# Patient Record
Sex: MALE | Race: WHITE | NOT HISPANIC OR LATINO | URBAN - METROPOLITAN AREA
[De-identification: names, ages, dates, MRNs, and addresses within clinical notes are randomized per-mention and may not be internally consistent; named-entity substitution may affect disease eponyms.]

---

## 2020-08-11 ENCOUNTER — NURSE TRIAGE (OUTPATIENT)
Dept: OTHER | Facility: OTHER | Age: 25
End: 2020-08-11

## 2020-08-11 DIAGNOSIS — Z11.59 SPECIAL SCREENING EXAMINATION FOR VIRAL DISEASE: Primary | ICD-10-CM

## 2020-08-11 NOTE — TELEPHONE ENCOUNTER
Reason for Disposition   COVID-19 Testing, questions about    Answer Assessment - Initial Assessment Questions  1  CLOSE CONTACT: "Who is the person with the confirmed or suspected COVID-19 infection that you were exposed to?"      unknown  2  PLACE of CONTACT: "Where were you when you were exposed to COVID-19?" (e g , home, school, medical waiting room; which city?)     Was to [facility]  3  TYPE of CONTACT: "How much contact was there?" (e g , sitting next to, live in same house, work in same office, same building)     none  4  DURATION of CONTACT: "How long were you in contact with the COVID-19 patient?" (e g , a few seconds, passed by person, a few minutes, live with the patient)     unknown  5  DATE of CONTACT: "When did you have contact with a COVID-19 patient?" (e g , how many days ago)     unknown  6  TRAVEL: "Have you traveled out of the country recently?" If so, "When and where?"      * Also ask about out-of-state travel, since the CDC has identified some high-risk cities for community spread in the [address]  * Note: Travel becomes less relevant if there is widespread community transmission where the patient lives  [facility]  7  COMMUNITY SPREAD: "Are there lots of cases of COVID-19 (community spread) where you live?" (See public health department website, if unsure)      Lives in [facility]  8   SYMPTOMS: "Do you have any symptoms?" (e g , fever, cough, breathing difficulty)    none    Desires testing prior to visiting family    Protocols used: CORONAVIRUS (COVID-19) EXPOSURE-ADULT-[place]

## 2020-08-11 NOTE — TELEPHONE ENCOUNTER
Regarding: [id number]  ----- Message from Shamika Miles sent at 8/11/2020  9:01 AM EDT -----  "I would like a COVID-19 test "

## 2020-08-12 DIAGNOSIS — Z11.59 SPECIAL SCREENING EXAMINATION FOR VIRAL DISEASE: ICD-10-CM

## 2020-08-12 PROCEDURE — U0003 INFECTIOUS AGENT DETECTION BY NUCLEIC ACID (DNA OR RNA); SEVERE ACUTE RESPIRATORY SYNDROME CORONAVIRUS 2 (SARS-COV-2) (CORONAVIRUS DISEASE [COVID-19]), AMPLIFIED PROBE TECHNIQUE, MAKING USE OF HIGH THROUGHPUT TECHNOLOGIES AS DESCRIBED BY CMS-2020-01-R: HCPCS | Performed by: FAMILY MEDICINE

## 2020-08-13 LAB — SARS-COV-2 RNA SPEC QL NAA+PROBE: NOT DETECTED

## 2020-08-14 ENCOUNTER — TELEPHONE (OUTPATIENT)
Dept: OTHER | Facility: OTHER | Age: 25
End: 2020-08-14

## 2020-08-14 NOTE — TELEPHONE ENCOUNTER
Your test for COVID-19, also known as novel coronavirus, came back negative  You do not have COVID-19  If you have any additional questions, we can schedule a virtual visit for you with a provider or call the [facility] hotline [phone] Option 7 for care advice  For additional information , please visit the Coronavirus FAQ on the [id number] [facility] ([facility])

## 2021-06-23 ENCOUNTER — APPOINTMENT (OUTPATIENT)
Dept: LAB | Facility: CLINIC | Age: 26
End: 2021-06-23
Payer: COMMERCIAL

## 2024-01-18 ENCOUNTER — APPOINTMENT (OUTPATIENT)
Dept: RADIOLOGY | Facility: MEDICAL CENTER | Age: 29
End: 2024-01-18
Payer: COMMERCIAL

## 2024-01-18 ENCOUNTER — OFFICE VISIT (OUTPATIENT)
Dept: URGENT CARE | Facility: MEDICAL CENTER | Age: 29
End: 2024-01-18
Payer: COMMERCIAL

## 2024-01-18 VITALS
OXYGEN SATURATION: 98 % | RESPIRATION RATE: 20 BRPM | WEIGHT: 244 LBS | SYSTOLIC BLOOD PRESSURE: 122 MMHG | TEMPERATURE: 97.7 F | HEART RATE: 87 BPM | DIASTOLIC BLOOD PRESSURE: 72 MMHG

## 2024-01-18 DIAGNOSIS — S16.1XXA ACUTE STRAIN OF NECK MUSCLE, INITIAL ENCOUNTER: ICD-10-CM

## 2024-01-18 DIAGNOSIS — S39.92XA BACK INJURY, INITIAL ENCOUNTER: ICD-10-CM

## 2024-01-18 DIAGNOSIS — S29.019A THORACIC MYOFASCIAL STRAIN, INITIAL ENCOUNTER: Primary | ICD-10-CM

## 2024-01-18 PROCEDURE — 99212 OFFICE O/P EST SF 10 MIN: CPT | Performed by: PHYSICIAN ASSISTANT

## 2024-01-18 PROCEDURE — 72040 X-RAY EXAM NECK SPINE 2-3 VW: CPT

## 2024-01-18 PROCEDURE — 72070 X-RAY EXAM THORAC SPINE 2VWS: CPT

## 2024-01-18 RX ORDER — PREDNISONE 20 MG/1
TABLET ORAL
Qty: 12 TABLET | Refills: 0 | Status: SHIPPED | OUTPATIENT
Start: 2024-01-18

## 2024-01-18 RX ORDER — BACLOFEN 20 MG/1
20 TABLET ORAL 3 TIMES DAILY PRN
Qty: 20 TABLET | Refills: 0 | Status: SHIPPED | OUTPATIENT
Start: 2024-01-18

## 2024-01-18 NOTE — PROGRESS NOTES
[facility]        NAME: Woody Jones is a 28 y.o. male  : 1995    MRN: [id number]  DATE: 2024  TIME: 10:16 AM    Assessment and Plan   Thoracic myofascial strain, initial encounter [S29.019A]  1. Thoracic myofascial strain, initial encounter  baclofen 20 mg tablet    predniSONE 20 mg tablet      2. Acute strain of neck muscle, initial encounter  baclofen 20 mg tablet    predniSONE 20 mg tablet      3. Back injury, initial encounter  XR spine cervical 2 or 3 vw injury    XR spine thoracic 2 vw    XR spine thoracic 2 vw    XR spine cervical 2 or 3 vw injury            Patient Instructions     Start Prednisone  Take Baclofen as needed for muscle spasm / pain. Do not take the other muscle relaxer you were taking  Take Tylenol as needed for pain  If symptoms fail to improve follow up with PCP as you may need physical therapy to help work out the spasms  Follow up with PCP in 3-5 days.  Proceed to  ER if symptoms worsen.    Chief Complaint     Chief Complaint   Patient presents with   • Back Pain   • Neck Pain     Fell on 2023  Slipped on ice going down steps. Was a little pain to upper left side back and neck and Saturday is subsided but  got really worse. Has been taking motrin, tramadol, and muscle relaxer. Has numbness to left arm         History of Present Illness       Patient fell down some icy steps 2024.  He had some pain to his neck and upper back which dissipated in a few days.  5 days ago the pain reoccurred is primarily isolated in the left upper back into the neck.  Is associated slight numbness and tingling of his left arm.  Denies muscle weakness.  He has been taking muscle relaxer, ibuprofen and tramadol with minimal improvement.      Review of Systems   Review of Systems   Constitutional:  Negative for fever.   Musculoskeletal:  Positive for back pain and neck pain.   Neurological:  Positive for numbness (left arm). Negative for weakness.          Current Medications       Current Outpatient Medications:   •  baclofen 20 mg tablet, Take 1 tablet (20 mg total) by mouth 3 (three) times a day as needed for muscle spasms, Disp: 20 tablet, Rfl: 0  •  predniSONE 20 mg tablet, 2 tabs daily x 5 days, 1 tab daily x 2 days, Disp: 12 tablet, Rfl: 0    Current Allergies     Allergies as of 01/18/2024 - Reviewed 01/18/2024   Allergen Reaction Noted   • Tetracycline Rash 01/18/2024            The following portions of the patient's history were reviewed and updated as appropriate: allergies, current medications, past family history, past medical history, past social history, past surgical history and problem list.     History reviewed. No pertinent past medical history.    History reviewed. No pertinent surgical history.    History reviewed. No pertinent family history.      Medications have been verified.        Objective   /72   Pulse 87   Temp 97.7 °F (36.5 °C)   Resp 20   Wt 111 kg (244 lb)   SpO2 98%   No LMP for male patient.       Physical Exam     Physical Exam  Vitals and nursing note reviewed.   Constitutional:       Appearance: Normal appearance.   HENT:      Head: Normocephalic and atraumatic.   Cardiovascular:      Rate and Rhythm: Normal rate.   Pulmonary:      Effort: Pulmonary effort is normal.   Musculoskeletal:      Comments: No cervical vertebral TTP, mild tightness of cervical paraspinal muscle  Thoracic spine without TTP, Left intrascapular muscle tightness and TTP, no rashes.   Skin:     General: Skin is warm.   Neurological:      Mental Status: He is alert.     Cervical spine xray no acute osseous abnormality, straightening of cervical lordosis    Thoracic xray no acute osseous abnormality

## 2024-01-18 NOTE — LETTER
January 18, 2024     Patient: Woody Jones   YOB: 1995   Date of Visit: 1/18/2024       To Whom It May Concern:    It is my medical opinion that Woody Jones may return to work on 01/.    If you have any questions or concerns, please don't hesitate to call.         Sincerely,        Bel Bird PA-C    CC:   No Recipients

## 2024-01-18 NOTE — PATIENT INSTRUCTIONS
Start Prednisone  Take Baclofen as needed for muscle spasm / pain. Do not take the other muscle relaxer you were taking  Take Tylenol as needed for pain  If symptoms fail to improve follow up with PCP as you may need physical therapy to help work out the spasms

## 2024-12-18 ENCOUNTER — OFFICE VISIT (OUTPATIENT)
Dept: URGENT CARE | Facility: CLINIC | Age: 29
End: 2024-12-18
Payer: COMMERCIAL

## 2024-12-18 VITALS
RESPIRATION RATE: 18 BRPM | DIASTOLIC BLOOD PRESSURE: 87 MMHG | TEMPERATURE: 99.4 F | SYSTOLIC BLOOD PRESSURE: 133 MMHG | HEART RATE: 75 BPM | OXYGEN SATURATION: 98 %

## 2024-12-18 DIAGNOSIS — J02.0 STREP THROAT: Primary | ICD-10-CM

## 2024-12-18 LAB — S PYO AG THROAT QL: POSITIVE

## 2024-12-18 PROCEDURE — 99213 OFFICE O/P EST LOW 20 MIN: CPT | Performed by: PHYSICIAN ASSISTANT

## 2024-12-18 PROCEDURE — 87880 STREP A ASSAY W/OPTIC: CPT | Performed by: PHYSICIAN ASSISTANT

## 2024-12-18 RX ORDER — AMOXICILLIN 500 MG/1
500 TABLET, FILM COATED ORAL 2 TIMES DAILY
Qty: 20 TABLET | Refills: 0 | Status: SHIPPED | OUTPATIENT
Start: 2024-12-18 | End: 2024-12-28

## 2024-12-18 NOTE — PATIENT INSTRUCTIONS
Rapid strep positive.  Will treat with 10-day course of amoxicillin.  Continue supportive care.  All patient's questions answered.

## 2024-12-18 NOTE — PROGRESS NOTES
[facility]        NAME: Woody Jones is a 29 y.o. male  : 1995    MRN: [id number]  DATE: 2024  TIME: 10:21 AM    Assessment and Plan   Strep throat [J02.0]  1. Strep throat  POCT rapid strepA    amoxicillin (AMOXIL) 500 MG tablet            Patient Instructions     Patient Instructions   Rapid strep positive.  Will treat with 10-day course of amoxicillin.  Continue supportive care.  All patient's questions answered.      Follow up with PCP in 3-5 days.  Proceed to  ER if symptoms worsen.    Chief Complaint     Chief Complaint   Patient presents with    Cough    Sore Throat    Fever     Started over week         History of Present Illness       Patient is a 29-year-old male presenting today with sore throat and headache x 5 days.  Patient notes of last few days he has had a persistent sore throat, headache and chills, has been taking over-the-counter Advil which has helped slightly.  Denies any known sick contacts.  Has felt febrile but no temperature taken.  Also reports some loose stools over the last few days.  Denies abdominal pain, lightheadedness, dizziness, voice change, trouble swallowing.        Review of Systems   Review of Systems   Constitutional:  Positive for fatigue and fever. Negative for chills.   HENT:  Positive for sore throat. Negative for congestion, ear pain, sinus pressure and trouble swallowing.    Eyes:  Negative for pain.   Respiratory:  Negative for cough, chest tightness and shortness of breath.    Cardiovascular:  Negative for chest pain.   Gastrointestinal:  Negative for abdominal pain.   Musculoskeletal:  Negative for myalgias.   Skin:  Negative for rash.   Neurological:  Positive for headaches.         Current Medications       Current Outpatient Medications:     amoxicillin (AMOXIL) 500 MG tablet, Take 1 tablet (500 mg total) by mouth 2 (two) times a day for 10 days, Disp: 20 tablet, Rfl: 0    baclofen 20 mg tablet, Take 1 tablet (20 mg total) by  mouth 3 (three) times a day as needed for muscle spasms (Patient not taking: Reported on 12/18/2024), Disp: 20 tablet, Rfl: 0    predniSONE 20 mg tablet, 2 tabs daily x 5 days, 1 tab daily x 2 days (Patient not taking: Reported on 12/18/2024), Disp: 12 tablet, Rfl: 0    Current Allergies     Allergies as of 12/18/2024 - Reviewed 12/18/2024   Allergen Reaction Noted    Tetracycline Rash 01/18/2024            The following portions of the patient's history were reviewed and updated as appropriate: allergies, current medications, past family history, past medical history, past social history, past surgical history and problem list.     No past medical history on file.    No past surgical history on file.    No family history on file.      Medications have been verified.        Objective   /87   Pulse 75   Temp 99.4 °F (37.4 °C)   Resp 18   SpO2 98%        Physical Exam     Physical Exam  Vitals reviewed.   Constitutional:       General: He is not in acute distress.  HENT:      Head: Normocephalic.      Right Ear: Tympanic membrane, ear canal and external ear normal.      Left Ear: Tympanic membrane, ear canal and external ear normal.      Nose: Nose normal.      Mouth/Throat:      Mouth: Mucous membranes are moist.      Pharynx: Posterior oropharyngeal erythema present. No oropharyngeal exudate.   Cardiovascular:      Rate and Rhythm: Normal rate and regular rhythm.      Pulses: Normal pulses.      Heart sounds: Normal heart sounds.   Pulmonary:      Effort: Pulmonary effort is normal.      Breath sounds: Normal breath sounds.   Abdominal:      General: Abdomen is flat. Bowel sounds are normal.      Palpations: Abdomen is soft.      Tenderness: There is no abdominal tenderness.   Lymphadenopathy:      Cervical: No cervical adenopathy.   Skin:     General: Skin is warm.      Capillary Refill: Capillary refill takes less than 2 seconds.   Neurological:      General: No focal deficit present.      Mental Status:  He is alert.

## 2025-04-24 ENCOUNTER — APPOINTMENT (EMERGENCY)
Dept: RADIOLOGY | Facility: HOSPITAL | Age: 30
End: 2025-04-24
Payer: COMMERCIAL

## 2025-04-24 ENCOUNTER — HOSPITAL ENCOUNTER (EMERGENCY)
Facility: HOSPITAL | Age: 30
Discharge: HOME | End: 2025-04-25
Attending: EMERGENCY MEDICINE
Payer: COMMERCIAL

## 2025-04-24 VITALS
OXYGEN SATURATION: 99 % | HEIGHT: 69 IN | HEART RATE: 86 BPM | TEMPERATURE: 98 F | RESPIRATION RATE: 19 BRPM | DIASTOLIC BLOOD PRESSURE: 78 MMHG | BODY MASS INDEX: 35.55 KG/M2 | SYSTOLIC BLOOD PRESSURE: 125 MMHG | WEIGHT: 240 LBS

## 2025-04-24 DIAGNOSIS — S23.9XXA THORACIC BACK SPRAIN, INITIAL ENCOUNTER: Primary | ICD-10-CM

## 2025-04-24 PROCEDURE — 99283 EMERGENCY DEPT VISIT LOW MDM: CPT

## 2025-04-24 PROCEDURE — 72070 X-RAY EXAM THORAC SPINE 2VWS: CPT

## 2025-04-24 RX ORDER — KETOROLAC TROMETHAMINE 30 MG/ML
30 INJECTION, SOLUTION INTRAMUSCULAR; INTRAVENOUS ONCE
Status: COMPLETED | OUTPATIENT
Start: 2025-04-24 | End: 2025-04-25

## 2025-04-24 RX ORDER — METHOCARBAMOL 500 MG/1
750 TABLET, FILM COATED ORAL ONCE
Status: COMPLETED | OUTPATIENT
Start: 2025-04-24 | End: 2025-04-25

## 2025-04-25 PROCEDURE — 99284 EMERGENCY DEPT VISIT MOD MDM: CPT | Performed by: EMERGENCY MEDICINE

## 2025-04-25 PROCEDURE — 96372 THER/PROPH/DIAG INJ SC/IM: CPT

## 2025-04-25 RX ORDER — KETOROLAC TROMETHAMINE 10 MG/1
10 TABLET, FILM COATED ORAL EVERY 6 HOURS PRN
Qty: 15 TABLET | Refills: 0 | Status: SHIPPED | OUTPATIENT
Start: 2025-04-25

## 2025-04-25 RX ORDER — METHOCARBAMOL 500 MG/1
500 TABLET, FILM COATED ORAL 3 TIMES DAILY
Qty: 30 TABLET | Refills: 0 | Status: SHIPPED | OUTPATIENT
Start: 2025-04-25

## 2025-04-25 RX ORDER — PREDNISONE 50 MG/1
50 TABLET ORAL DAILY
Qty: 5 TABLET | Refills: 0 | Status: SHIPPED | OUTPATIENT
Start: 2025-04-25

## 2025-04-25 RX ADMIN — PREDNISONE 50 MG: 20 TABLET ORAL at 00:05

## 2025-04-25 RX ADMIN — METHOCARBAMOL 750 MG: 500 TABLET ORAL at 00:05

## 2025-04-25 RX ADMIN — KETOROLAC TROMETHAMINE 30 MG: 30 INJECTION, SOLUTION INTRAMUSCULAR at 00:06

## 2025-04-25 NOTE — ED PROVIDER NOTES
Time reflects when diagnosis was documented in both MDM as applicable and the Disposition within this note       Time User Action Codes Description Comment    4/25/2025 12:02 AM Price Patel Add [S23.9XXA] Thoracic back sprain, initial encounter           ED Disposition       ED Disposition   Discharge    Condition   Stable    Date/Time   Fri Apr 25, 2025 12:02 AM    Comment   Woody Jones discharge to home/self care.                   Assessment & Plan       Medical Decision Making  Problems Addressed:  Thoracic back sprain, initial encounter: acute illness or injury    Amount and/or Complexity of Data Reviewed  Radiology: ordered and independent interpretation performed.  Discussion of management or test interpretation with external provider(s): Patient clinical presentation is benign.    Meaning patient's vital signs are normal and stable ED Triage Vitals [04/24/25 2332]  Temperature: 98 °F (36.7 °C)  Pulse: 86  Respirations: 19  Blood Pressure: 125/78  SpO2: 99 %  Temp src: n/a  Heart Rate Source: Monitor  Patient Position - Orthostatic VS: n/a  BP Location: n/a  FiO2 (%): n/a  Pain Score: 7.    Patient in no distress.    Chief complaint, vital signs, physical examination does not suggest an acute medical emergency at this time.       Risk  OTC drugs.  Prescription drug management.             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 No pertinent past  medical history.   History reviewed. No pertinent surgical history.   History reviewed. No pertinent family history.   Social History     Tobacco Use    Smoking status: Every Day     Types: Cigarettes    Smokeless tobacco: Never   Vaping Use    Vaping status: Never Used   Substance Use Topics    Alcohol use: Never    Drug use: Yes     Types: Marijuana      E-Cigarette/Vaping    E-Cigarette Use Never User       E-Cigarette/Vaping Substances    Nicotine No     THC No     CBD No     Flavoring No     Other No     Unknown No       I have reviewed and agree with the history as documented.     Woody Jones is a 29 y.o.  year old male  History reviewed. No pertinent past medical history.  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  Aggravated by palpation and movement.  He does not have any systemic symptoms.  He does complain of some paresthesia on the triceps area of his arm.  Not distally.                  History provided by:  Patient   used: No    Back Pain  Associated symptoms: no abdominal pain, no chest pain, no dysuria and no fever    Shoulder Pain  Associated symptoms: back pain    Associated symptoms: no fever        Review of Systems   Constitutional:  Negative for chills and fever.   HENT:  Negative for ear pain and sore throat.    Eyes:  Negative for pain and visual disturbance.   Respiratory:  Negative for cough and shortness of breath.    Cardiovascular:  Negative for chest pain and palpitations.    Gastrointestinal:  Negative for abdominal pain and vomiting.   Genitourinary:  Negative for dysuria and hematuria.   Musculoskeletal:  Positive for back pain. Negative for arthralgias.   Skin:  Negative for color change and rash.   Neurological:  Negative for seizures and syncope.   All other systems reviewed and are negative.          Objective       ED Triage Vitals [04/24/25 2332]   Temperature Pulse Blood Pressure Respirations SpO2 Patient Position - Orthostatic VS   98 °F (36.7 °C) 86 125/78 19 99 % --      Temp src Heart Rate Source BP Location FiO2 (%) Pain Score    -- Monitor -- -- 7      Vitals      Date and Time Temp Pulse SpO2 Resp BP Pain Score FACES Pain Rating User   04/24/25 2332 98 °F (36.7 °C) 86 99 % 19 125/78 7 -- AF            Physical Exam  Vitals and nursing note reviewed.   Constitutional:       General: He is not in acute distress.     Appearance: Normal appearance. He is well-developed.   HENT:      Head: Normocephalic and atraumatic.      Right Ear: External ear normal.      Left Ear: External ear normal.   Eyes:      Conjunctiva/sclera: Conjunctivae normal.   Cardiovascular:      Rate and Rhythm: Normal rate and regular rhythm.      Heart sounds: No murmur heard.  Pulmonary:      Effort: Pulmonary effort is normal. No respiratory distress.      Breath sounds: Normal breath sounds.   Abdominal:      Palpations: Abdomen is soft.      Tenderness: There is no abdominal tenderness.   Musculoskeletal:         General: No swelling.      Cervical back: Neck supple.      Comments: Tenderness to palpation of the paraspinal muscles on the right side.  No crepitus no swelling no ecchymosis no rash   Skin:     General: Skin is warm and dry.      Capillary Refill: Capillary refill takes less than 2 seconds.   Neurological:      General: No focal deficit present.      Mental Status: He is alert and oriented to person, place, and time.   Psychiatric:         Mood and Affect: Mood normal.         Thought  Content: Thought content normal.         Results Reviewed       None            XR spine thoracic 2 views   ED Interpretation by Price Patel MD ( 0002)   Radiology studies have been independently visualized by me.  Preliminary interpretation: no fracture or dislocation or foreign bodies   All radiology studies will be officially read by the radiologist and any discrepancies flagged and follow through the discrepancy management process to make the patient aware of significant results.            Procedures    ED Medication and Procedure Management   Prior to Admission Medications   Prescriptions Last Dose Informant Patient Reported? Taking?   baclofen 20 mg tablet   No No   Sig: Take 1 tablet (20 mg total) by mouth 3 (three) times a day as needed for muscle spasms   Patient not taking: Reported on 2024   predniSONE 20 mg tablet   No No   Si tabs daily x 5 days, 1 tab daily x 2 days   Patient not taking: Reported on 2024      Facility-Administered Medications: None     Discharge Medication List as of 2025 12:08 AM        START taking these medications    Details   ketorolac (TORADOL) 10 mg tablet Take 1 tablet (10 mg total) by mouth every 6 (six) hours as needed for moderate pain, Starting Fri 2025, Normal      methocarbamol (ROBAXIN) 500 mg tablet Take 1 tablet (500 mg total) by mouth 3 (three) times a day, Starting Fri 2025, Normal      !! predniSONE 50 mg tablet Take 1 tablet (50 mg total) by mouth daily, Starting Fri 2025, Normal       !! - Potential duplicate medications found. Please discuss with provider.        CONTINUE these medications which have NOT CHANGED    Details   baclofen 20 mg tablet Take 1 tablet (20 mg total) by mouth 3 (three) times a day as needed for muscle spasms, Starting Thu 2024, Normal      !! predniSONE 20 mg tablet 2 tabs daily x 5 days, 1 tab daily x 2 days, Normal       !! - Potential duplicate medications found. Please discuss with  provider.        No discharge procedures on file.  ED SEPSIS DOCUMENTATION   Time reflects when diagnosis was documented in both MDM as applicable and the Disposition within this note       Time User Action Codes Description Comment    4/25/2025 12:02 AM Price Patel Add [S23.9XXA] Thoracic back sprain, initial encounter                  Price Patel MD  04/25/25 0330

## 2025-04-25 NOTE — DISCHARGE INSTRUCTIONS
A  personal message from Dr. Price Patel,  Thank you so much for allowing me to care for you today.    I pride myself in the care and attention I give all my patients.  I hope you were a witness to this tonight.   If for any reason your condition does not improve or worsens, or you have a question that was not answered during your visit you can feel free to text me on my personal phone #  # [phone].   I will answer to your message and continue your care past your emergency room visit.     Please understand that although you are being discharged because your condition has been deemed stable and able to be managed on an outpatient setting. However your condition may worsen as part of the natural progression of the illness/condition, if this occurs please come back to the emergency department for a repeat evaluation.

## 2025-05-07 ENCOUNTER — OFFICE VISIT (OUTPATIENT)
Dept: URGENT CARE | Facility: CLINIC | Age: 30
End: 2025-05-07
Payer: COMMERCIAL

## 2025-05-07 VITALS
RESPIRATION RATE: 18 BRPM | SYSTOLIC BLOOD PRESSURE: 139 MMHG | TEMPERATURE: 98 F | OXYGEN SATURATION: 97 % | DIASTOLIC BLOOD PRESSURE: 84 MMHG | HEART RATE: 88 BPM

## 2025-05-07 DIAGNOSIS — R11.0 NAUSEA: ICD-10-CM

## 2025-05-07 DIAGNOSIS — S46.911A RIGHT SHOULDER STRAIN, INITIAL ENCOUNTER: Primary | ICD-10-CM

## 2025-05-07 PROCEDURE — 99213 OFFICE O/P EST LOW 20 MIN: CPT

## 2025-05-07 RX ORDER — METHOCARBAMOL 500 MG/1
500 TABLET, FILM COATED ORAL 3 TIMES DAILY
Qty: 21 TABLET | Refills: 0 | Status: SHIPPED | OUTPATIENT
Start: 2025-05-07 | End: 2025-05-14

## 2025-05-07 RX ORDER — ONDANSETRON 4 MG/1
4 TABLET, FILM COATED ORAL EVERY 8 HOURS PRN
Qty: 20 TABLET | Refills: 0 | Status: SHIPPED | OUTPATIENT
Start: 2025-05-07

## 2025-05-07 NOTE — PATIENT INSTRUCTIONS
May take OTC Tylenol or Ibuprofen as needed for pain.   May take Robaxin 3 times daily as needed for muscle spasms.    You may apply ice/moist heat to affected area 4 times daily for up to 20 minutes for any pain or swelling.     May apply OTC Lidoderm, Biofreeze, IcyHot, etc. as needed for pain.      If your symptoms do not improve with our current treatment plan or worsen, please schedule an appointment with your PCP. If you develop any high or persistent fevers, chest pain, palpitations, shortness of breath, difficulty swallowing, decreased urine output, abdominal pain, dizziness or any other concerning symptoms, please proceed to the ED.     Try your best to stay hydrated and drink more fluids to replace fluids lost from any vomiting and diarrhea.     Water is best, but you can try broths, diluted fruit juices or clear sodas.    Ginger pops or ginger ale may also help with nausea.  You may also try oral rehydration or electrolyte solutions including Gatorade, Powerade, Pedialyte, etc.   Please avoid drinks with milk, red dyes, caffeine, or alcohol.  Once feeling better, may advance to bland diet (ie. BRAT - bananas, applesauce, toast) as tolerated.    Recommend avoiding any milk products, spicy, greasy foods and citrus products over the next 1-2 weeks.      To prevent the spread of infection, you can practice good hygiene including frequent hand washing and disinfecting surfaces.     Fevers should resolve within 4-5 days with viral infections. Vomiting usually subsides in 24-48 hours. Diarrhea can last up to 1 week but should steadily improve.    Follow-up with your primary care physician in 2-3 days if symptoms persist. Please proceed to the ED with any drowsiness, persistent vomiting or vomiting blood, bloody diarrhea, or signs of dehydration.

## 2025-05-07 NOTE — PROGRESS NOTES
[facility]        NAME: Woody Jones is a 29 y.o. male  : 1995    MRN: [id number]  DATE: May 8, 2025  TIME: 9:38 AM    Assessment and Plan   Right shoulder strain, initial encounter [S46.911A]  1. Right shoulder strain, initial encounter  methocarbamol (ROBAXIN) 500 mg tablet      2. Nausea  ondansetron (ZOFRAN) 4 mg tablet        Robaxin refilled, will also send Zofran for any nausea.  Recommend supportive care with OTC Tylenol/ibuprofen as needed for pain.  Discussed RICE and applying ice/moist heat to the affected area. Instructed patient to follow-up with PCP for no improvement or worsening of symptoms.  Patient educated on red flag symptoms and when to proceed to the ED.  Patient agreeable and understands current treatment plan.     Patient Instructions     Patient Instructions   May take OTC Tylenol or Ibuprofen as needed for pain.   May take Robaxin 3 times daily as needed for muscle spasms.    You may apply ice/moist heat to affected area 4 times daily for up to 20 minutes for any pain or swelling.     May apply OTC Lidoderm, Biofreeze, IcyHot, etc. as needed for pain.      If your symptoms do not improve with our current treatment plan or worsen, please schedule an appointment with your PCP. If you develop any high or persistent fevers, chest pain, palpitations, shortness of breath, difficulty swallowing, decreased urine output, abdominal pain, dizziness or any other concerning symptoms, please proceed to the ED.     Try your best to stay hydrated and drink more fluids to replace fluids lost from any vomiting and diarrhea.     Water is best, but you can try broths, diluted fruit juices or clear sodas.    Ginger pops or ginger ale may also help with nausea.  You may also try oral rehydration or electrolyte solutions including Gatorade, Powerade, Pedialyte, etc.   Please avoid drinks with milk, red dyes, caffeine, or alcohol.  Once feeling better, may advance to bland diet (ie. BRAT -  bananas, applesauce, toast) as tolerated.    Recommend avoiding any milk products, spicy, greasy foods and citrus products over the next 1-2 weeks.      To prevent the spread of infection, you can practice good hygiene including frequent hand washing and disinfecting surfaces.     Fevers should resolve within 4-5 days with viral infections. Vomiting usually subsides in 24-48 hours. Diarrhea can last up to 1 week but should steadily improve.    Follow-up with your primary care physician in 2-3 days if symptoms persist. Please proceed to the ED with any drowsiness, persistent vomiting or vomiting blood, bloody diarrhea, or signs of dehydration.        Follow up with PCP in 3-5 days.  Proceed to  ER if symptoms worsen.    Chief Complaint     Chief Complaint   Patient presents with   • Shoulder Pain   • Back Pain     Weeks , needs muscle relaxer for pain         History of Present Illness       29-year-old male presents to the clinic for evaluation of right shoulder pain x 2 weeks.  Patient denied any trauma to the right shoulder however states prior to his pain he was making deli sandwiches on a  which was on a higher table than normal and he sliced around 400 slices in only 15 minutes.  Patient reports he was seen in the ED on 4/24/2025 for this pain and was diagnosed with a thoracic back strain.  XR imaging was negative at that time.  He was placed on prednisone and Robaxin and reports he has been doing well however he ran out of Robaxin and needs a refill.  Patient reports the pain is around a 2 out of 10.  He states the pain can however jump to a 9 out of 10 at its worst.  He reports the pain is aggravated with movement and improves with rest.  He reports over the past 2 weeks he has been going to a chiropractor and getting massages.  Overall, patient reports the pain has been slowly improving however is not completely resolved yet.  He is also been unable to get into his PCP.  He has also been taking  OTC Tylenol/ibuprofen and sitting in a hot tub to help with the pain.  Patient also reports his daughters are getting over a GI bug and he has also had some mild nausea and nonbloody diarrhea.        Shoulder Pain   Associated symptoms include a fever. Pertinent negatives include no numbness.   Back Pain  Associated symptoms include a fever. Pertinent negatives include no abdominal pain, chest pain, headaches or numbness.       Review of Systems   Review of Systems   Constitutional:  Positive for chills and fever.   Respiratory:  Negative for cough and shortness of breath.    Cardiovascular:  Negative for chest pain and palpitations.   Gastrointestinal:  Positive for diarrhea and nausea. Negative for abdominal distention, abdominal pain, blood in stool and vomiting.   Genitourinary:  Positive for genital sores.   Musculoskeletal:  Positive for arthralgias (right shoulder).   Skin:  Negative for color change.   Neurological:  Negative for dizziness, numbness and headaches.         Current Medications       Current Outpatient Medications:   •  methocarbamol (ROBAXIN) 500 mg tablet, Take 1 tablet (500 mg total) by mouth 3 (three) times a day for 7 days, Disp: 21 tablet, Rfl: 0  •  ondansetron (ZOFRAN) 4 mg tablet, Take 1 tablet (4 mg total) by mouth every 8 (eight) hours as needed for nausea or vomiting, Disp: 20 tablet, Rfl: 0  •  baclofen 20 mg tablet, Take 1 tablet (20 mg total) by mouth 3 (three) times a day as needed for muscle spasms (Patient not taking: Reported on 5/7/2025), Disp: 20 tablet, Rfl: 0  •  ketorolac (TORADOL) 10 mg tablet, Take 1 tablet (10 mg total) by mouth every 6 (six) hours as needed for moderate pain (Patient not taking: Reported on 5/7/2025), Disp: 15 tablet, Rfl: 0  •  methocarbamol (ROBAXIN) 500 mg tablet, Take 1 tablet (500 mg total) by mouth 3 (three) times a day (Patient not taking: Reported on 5/7/2025), Disp: 30 tablet, Rfl: 0  •  predniSONE 20 mg tablet, 2 tabs daily x 5 days, 1 tab  daily x 2 days (Patient not taking: Reported on 5/7/2025), Disp: 12 tablet, Rfl: 0  •  predniSONE 50 mg tablet, Take 1 tablet (50 mg total) by mouth daily (Patient not taking: Reported on 5/7/2025), Disp: 5 tablet, Rfl: 0    Current Allergies     Allergies as of 05/07/2025 - Reviewed 05/07/2025   Allergen Reaction Noted   • Tetracycline Rash 01/18/2024            The following portions of the patient's history were reviewed and updated as appropriate: allergies, current medications, past family history, past medical history, past social history, past surgical history and problem list.     History reviewed. No pertinent past medical history.    No past surgical history on file.    No family history on file.      Medications have been verified.        Objective   /84   Pulse 88   Temp 98 °F (36.7 °C)   Resp 18   SpO2 97%        Physical Exam     Physical Exam  Vitals and nursing note reviewed.   Constitutional:       Appearance: Normal appearance.   HENT:      Head: Normocephalic and atraumatic.   Cardiovascular:      Rate and Rhythm: Normal rate and regular rhythm.      Heart sounds: Normal heart sounds.   Pulmonary:      Effort: Pulmonary effort is normal.      Breath sounds: Normal breath sounds.   Abdominal:      General: Bowel sounds are normal. There is no distension.      Palpations: Abdomen is soft.      Tenderness: There is no abdominal tenderness.   Musculoskeletal:      Right shoulder: No swelling, laceration or tenderness. Normal range of motion. Normal strength. Normal pulse.      Comments: Subjective pain with movement to the right shoulder; full ROM; no erythema, edema or ecchymosis appreciated   Skin:     General: Skin is warm and dry.      Findings: No bruising or erythema.   Neurological:      General: No focal deficit present.      Mental Status: He is alert.   Psychiatric:         Mood and Affect: Mood normal.         Behavior: Behavior normal.

## 2025-06-06 ENCOUNTER — OFFICE VISIT (OUTPATIENT)
Dept: FAMILY MEDICINE CLINIC | Facility: CLINIC | Age: 30
End: 2025-06-06
Payer: COMMERCIAL

## 2025-06-06 VITALS
DIASTOLIC BLOOD PRESSURE: 82 MMHG | HEART RATE: 74 BPM | WEIGHT: 233 LBS | OXYGEN SATURATION: 98 % | HEIGHT: 69 IN | SYSTOLIC BLOOD PRESSURE: 124 MMHG | TEMPERATURE: 98.5 F | BODY MASS INDEX: 34.51 KG/M2

## 2025-06-06 DIAGNOSIS — Z11.4 SCREENING FOR HIV (HUMAN IMMUNODEFICIENCY VIRUS): ICD-10-CM

## 2025-06-06 DIAGNOSIS — Z00.00 ANNUAL PHYSICAL EXAM: ICD-10-CM

## 2025-06-06 DIAGNOSIS — E78.2 MIXED HYPERLIPIDEMIA: ICD-10-CM

## 2025-06-06 DIAGNOSIS — Z11.59 NEED FOR HEPATITIS C SCREENING TEST: Primary | ICD-10-CM

## 2025-06-06 DIAGNOSIS — Z13.1 SCREENING FOR DIABETES MELLITUS: ICD-10-CM

## 2025-06-06 PROCEDURE — 99385 PREV VISIT NEW AGE 18-39: CPT | Performed by: INTERNAL MEDICINE

## 2025-06-06 NOTE — PROGRESS NOTES
Adult Annual Physical  Name: Woody Jones      : 1995      MRN: [id number]  Encounter Provider: Shailesh William DO  Encounter Date: 2025   Encounter department: [facility]    :  Assessment & Plan  Annual physical exam  Discussed healthy lifestyle.  Diet and exercise were large component of her conversation as well as avoidance of nicotine products and moderation of alcohol.  Reviewed family history and medication use.  Reviewed immunizations today and updated accordingly.  Currently offers no complaints.  No significant changes in health since last year.        Need for hepatitis C screening test  Agrees to screening  Orders:  •  Hepatitis C Antibody; Future    Screening for HIV (human immunodeficiency virus)  Agrees to screening  Orders:  •  HIV 1/2 AG/AB w Reflex [facility] for 2 yr old and above; Future    Mixed hyperlipidemia  Check lipids  Orders:  •  Lipid Panel with Direct LDL reflex; Future    Screening for diabetes mellitus  Agrees to screening  Orders:  •  Hemoglobin A1C; Future        Preventive Screenings:  - Diabetes Screening: risks/benefits discussed and orders placed  - Cholesterol Screening: risks/benefits discussed and orders placed   - Hepatitis C screening: orders placed and risks/benefits discussed   - HIV screening: risks/benefits discussed and orders placed   - Colon cancer screening: risks/benefits discussed and screening not indicated   - Lung cancer screening: screening not indicated   - Prostate cancer screening: screening not indicated     Immunizations:  - Immunizations due: Prevnar 20 and Tdap    Counseling/Anticipatory Guidance:    - Diet: discussed recommendations for a healthy/well-balanced diet.   - Exercise: the importance of regular exercise/physical activity was discussed. Recommend exercise 3-5 times per week for at least 30 minutes.          History of Present Illness     Adult Annual Physical:  Patient presents for annual physical.  "Patient here for routine exam.  Offers no complaints of chest pain shortness of breath nausea vomiting or diarrhea.  Bowels and bladder been normal.  No significant past medical history.  No significant family history.  Non-smoker.  Reviewed need for vaccinations..     Diet and Physical Activity:  - Diet/Nutrition: no special diet.  - Exercise: no formal exercise and walking.    Depression Screening:  - PHQ-2 Score: 0    General Health:  - Sleep: sleeps well and 7-8 hours of sleep on average.  - Hearing: normal hearing bilateral ears.  - Vision: wears glasses and most recent eye exam < 1 year ago.  - Dental: regular dental visits and brushes teeth twice daily.     Health:  - History of STDs: no.   - Urinary symptoms: none.     Advanced Care Planning:  - Has an advanced directive?: no    - Has a durable medical POA?: no    - ACP document given to patient?: no      Review of Systems   Constitutional:  Negative for chills and fever.   HENT:  Negative for ear pain, rhinorrhea and sore throat.    Eyes:  Negative for pain, redness and visual disturbance.   Respiratory:  Negative for cough and shortness of breath.    Cardiovascular:  Negative for chest pain and leg swelling.   Gastrointestinal:  Negative for abdominal pain, diarrhea, nausea and vomiting.   Genitourinary:  Negative for dysuria, flank pain, frequency and urgency.   Musculoskeletal:  Negative for back pain, myalgias and neck pain.   Skin:  Negative for rash.   Neurological:  Negative for dizziness, weakness, light-headedness and headaches.   Hematological: Negative.    Psychiatric/Behavioral:  Negative for agitation, confusion and suicidal ideas. The patient is not nervous/anxious.    All other systems reviewed and are negative.        Objective   /82 (BP Location: Left arm, Patient Position: Sitting, Cuff Size: Large)   Pulse 74   Temp 98.5 °F (36.9 °C) (Tympanic)   Ht 5' 9\" (1.753 m)   Wt 106 kg (233 lb)   SpO2 98%   BMI 34.41 kg/m² "     Physical Exam  Vitals and nursing note reviewed.   Constitutional:       General: He is not in acute distress.     Appearance: Normal appearance. He is well-developed.   HENT:      Head: Normocephalic and atraumatic.     Eyes:      Conjunctiva/sclera: Conjunctivae normal.       Cardiovascular:      Rate and Rhythm: Normal rate and regular rhythm.      Pulses: Normal pulses.      Heart sounds: Normal heart sounds. No murmur heard.  Pulmonary:      Effort: Pulmonary effort is normal. No respiratory distress.      Breath sounds: Normal breath sounds.   Abdominal:      Palpations: Abdomen is soft.      Tenderness: There is no abdominal tenderness.     Musculoskeletal:         General: No swelling.      Cervical back: Neck supple.     Skin:     General: Skin is warm and dry.      Capillary Refill: Capillary refill takes less than 2 seconds.     Neurological:      General: No focal deficit present.      Mental Status: He is alert and oriented to person, place, and time.     Psychiatric:         Mood and Affect: Mood normal.